# Patient Record
Sex: MALE | Employment: OTHER | RURAL
[De-identification: names, ages, dates, MRNs, and addresses within clinical notes are randomized per-mention and may not be internally consistent; named-entity substitution may affect disease eponyms.]

---

## 2017-03-01 ENCOUNTER — OFFICE VISIT (OUTPATIENT)
Dept: SURGERY | Age: 67
End: 2017-03-01

## 2017-03-01 VITALS
BODY MASS INDEX: 30.62 KG/M2 | HEART RATE: 92 BPM | WEIGHT: 226.1 LBS | HEIGHT: 72 IN | SYSTOLIC BLOOD PRESSURE: 150 MMHG | DIASTOLIC BLOOD PRESSURE: 87 MMHG

## 2017-03-01 DIAGNOSIS — K62.5 RECTAL BLEEDING: Primary | ICD-10-CM

## 2017-03-01 DIAGNOSIS — Z86.010 HX OF ADENOMATOUS COLONIC POLYPS: ICD-10-CM

## 2017-03-01 DIAGNOSIS — Z01.818 PREOPERATIVE EXAMINATION: ICD-10-CM

## 2017-03-01 NOTE — MR AVS SNAPSHOT
Visit Information Date & Time Provider Department Dept. Phone Encounter #  
 3/1/2017 11:00 AM Daljit Carrasco  [address]  [facility] [phone] [id number] Follow-up Instructions Return for postop follow up. Follow-up and Disposition History Your Appointments 3/31/2017  9:20 AM  
POST OP 10 MIN with MD BRODY Khan [facility] [address] ([facility]) Appt Note: F/U COLON  
 [place], [address] [facility],[address], [address] [employer] Upcoming Health Maintenance Date Due Hepatitis C Screening 1950 DTaP/Tdap/Td series (1 - Tdap) 9/19/1969 FOBT Q 1 YEAR AGE 50-75 9/19/1998 ZOSTER VACCINE AGE 60> 9/19/2008 GLAUCOMA SCREENING Q2Y 9/19/2013 Pneumococcal 65+ Low/Medium Risk (1 of 2 - PCV13) 9/19/2013 MEDICARE YEARLY EXAM 9/19/2013 INFLUENZA AGE 9 TO ADULT 8/1/2016 Allergies as of 3/1/2017  Review Complete On: 3/1/2017 By: Peace Toussaint LPN No Known Allergies Current Immunizations  Never Reviewed No immunizations on file. Not reviewed this visit You Were Diagnosed With   
  
 Codes Comments Rectal bleeding    -  Primary ICD-10-CM: K62.5 ICD-9-CM: 569.3 Hx of adenomatous colonic polyps     ICD-10-CM: Z86.010 
ICD-9-CM: V12.72 Preoperative examination     ICD-10-CM: Z01.818 ICD-9-CM: V72.84 Vitals BP  
  
  
  
  
  
 150/87 Vitals History BMI and BSA Data Body Mass Index Body Surface Area  
 30.66 kg/m 2 2.28 m 2 Your Updated Medication List  
  
   
This list is accurate as of: 3/1/17 11:39 AM.  Always use your most recent med list.  
  
  
  
  
 hydroCHLOROthiazide 25 mg tablet Commonly known as:  HYDRODIURIL Take 25 mg by mouth daily. lovastatin 20 mg tablet Commonly known as:  MEVACOR Take 20 mg by mouth nightly. metFORMIN 500 mg tablet Commonly known as:  GLUCOPHAGE Take  by mouth two (2) times daily (with meals). Follow-up Instructions Return for postop follow up. Introducing [facility]! [facility] introduces [email or web address] patient portal. Now you can access parts of your medical record, email your doctor's office, and request medication refills online. 1. In your internet browser, go to [email or web address] 2. Click on the First Time User? Click Here link in the Sign In box. You will see the New Member Sign Up page. 3. Enter your [email or web address] Access Code exactly as it appears below. You will not need to use this code after youve completed the sign-up process. If you do not sign up before the expiration date, you must request a new code. · [email or web address] Access Code: [email or web address] Expires: 5/30/2017 10:46 AM 
 
4. Enter the last four digits of your Social Security Number (xxxx) and Date of Birth (mm/dd/yyyy) as indicated and click Submit. You will be taken to the next sign-up page. 5. Create a [email or web address] ID. This will be your [email or web address] login ID and cannot be changed, so think of one that is secure and easy to remember. 6. Create a [email or web address] password. You can change your password at any time. 7. Enter your Password Reset Question and Answer. This can be used at a later time if you forget your password. 8. Enter your e-mail address. You will receive e-mail notification when new information is available in [address] 9. Click Sign Up. You can now view and download portions of your medical record. 10. Click the Download Summary menu link to download a portable copy of your medical information. If you have questions, please visit the Frequently Asked Questions section of the [email or web address] website. Remember, [email or web address] is NOT to be used for urgent needs. For medical emergencies, dial 911. Now available from your iPhone and Android! Please provide this summary of care documentation to your next provider. Your primary care clinician is listed as Merlin Meyer. If you have any questions after today's visit, please call [phone].

## 2017-03-01 NOTE — PROGRESS NOTES
HISTORY OF PRESENT ILLNESS  Nimesh Germain is a 74 y.o. male. Patient referred by Rafael Wilhelm for evaluation of rectal bleeding episode back in November. Known history of colon polyps, status post colonoscopy with polypectomy in 2015. HPI had an episode of red painless rectal bleeding back in November 2016 primary care provider recommended a repeat colonoscopy at that time the patient never got around to it. He returns today to follow-up with that. He had a colonoscopy in 2015 and a couple of adenomatous colon polyps were removed. Has not had any weight loss or change in his bowel habit has not had any unexplained abdominal pain. Past Medical History:   Diagnosis Date    Dyspepsia and other specified disorders of function of stomach     Hypertension        Past Surgical History:   Procedure Laterality Date    HX COLONOSCOPY  2011    luke polyp tub type    HX COLONOSCOPY  2015    5 polyps    HX PROSTATECTOMY           Current Outpatient Prescriptions:     lovastatin (MEVACOR) 20 mg tablet, Take 20 mg by mouth nightly., Disp: , Rfl:     hydrochlorothiazide (HYDRODIURIL) 25 mg tablet, Take 25 mg by mouth daily. , Disp: , Rfl:     metFORMIN (GLUCOPHAGE) 500 mg tablet, Take  by mouth two (2) times daily (with meals). , Disp: , Rfl:     Review of patient's allergies indicates no known allergies. family history includes Cancer in his brother and mother. Social History     Social History    Marital status:      Spouse name: N/A    Number of children: N/A    Years of education: N/A     Occupational History    Not on file. Social History Main Topics    Smoking status: Never Smoker    Smokeless tobacco: Never Used    Alcohol use No    Drug use: Not on file    Sexual activity: Not on file     Other Topics Concern    Not on file     Social History Narrative         Review of Systems   Constitutional: Negative. HENT: Negative. Eyes: Negative. Respiratory: Negative. Gastrointestinal: Negative. Negative for abdominal pain, blood in stool, constipation, diarrhea, heartburn, melena, nausea and vomiting. Rectal bleeding as described in the HPI   Genitourinary: Negative. Musculoskeletal: Negative. Skin: Negative. Neurological: Negative. Endo/Heme/Allergies: Negative. Psychiatric/Behavioral: Negative. Physical Exam   Constitutional: He is oriented to person, place, and time. He appears well-developed and well-nourished. No distress. HENT:   Head: Normocephalic and atraumatic. Right Ear: External ear normal.   Left Ear: External ear normal.   Nose: Nose normal.   Mouth/Throat: Oropharynx is clear and moist.   Eyes: Conjunctivae and EOM are normal. Pupils are equal, round, and reactive to light. Right eye exhibits no discharge. Left eye exhibits no discharge. No scleral icterus. Neck: Normal range of motion. Neck supple. No JVD present. No tracheal deviation present. No thyromegaly present. Cardiovascular: Normal rate, regular rhythm, normal heart sounds and intact distal pulses. Exam reveals no gallop and no friction rub. No murmur heard. Pulmonary/Chest: Effort normal and breath sounds normal. No stridor. No respiratory distress. He has no wheezes. He has no rales. He exhibits no tenderness. Abdominal: Soft. Bowel sounds are normal. He exhibits no distension and no mass. There is no tenderness. There is no rebound and no guarding. Musculoskeletal: Normal range of motion. He exhibits no edema, tenderness or deformity. Lymphadenopathy:     He has no cervical adenopathy. Neurological: He is alert and oriented to person, place, and time. He has normal reflexes. He displays normal reflexes. No cranial nerve deficit. He exhibits normal muscle tone. Coordination normal.   Skin: Skin is warm and dry. No rash noted. He is not diaphoretic. No erythema. No pallor. Psychiatric: He has a normal mood and affect.  His behavior is normal. Judgment and thought content normal.       ASSESSMENT and PLAN  Rectal bleeding. Personal history of adenomatous colon polyps. Preoperative exam.   [facility] will [facility]. She prefers a magnesium citrate prep. The expected benefits and potential risks and alternatives are discussed with the patient who demonstrates understanding and expresses his wish to have the procedure. More than half of the time spent with the patient was in face to face counseling. I spent 45 minutes in this encounter with the patient.

## 2017-03-31 ENCOUNTER — OFFICE VISIT (OUTPATIENT)
Dept: SURGERY | Age: 67
End: 2017-03-31

## 2017-03-31 VITALS
SYSTOLIC BLOOD PRESSURE: 132 MMHG | BODY MASS INDEX: 29 KG/M2 | HEIGHT: 74 IN | DIASTOLIC BLOOD PRESSURE: 74 MMHG | HEART RATE: 82 BPM | WEIGHT: 226 LBS

## 2017-03-31 DIAGNOSIS — D12.6 ADENOMATOUS COLON POLYP: ICD-10-CM

## 2017-03-31 DIAGNOSIS — D12.6 SERRATED ADENOMA OF COLON: ICD-10-CM

## 2017-03-31 DIAGNOSIS — Z98.890 STATUS POST COLONOSCOPY WITH POLYPECTOMY: Primary | ICD-10-CM

## 2017-03-31 NOTE — PROGRESS NOTES
HISTORY OF PRESENT ILLNESS  Mike Winslow is a 74 y.o. male. Status post colonoscopy with polypectomy adenomatous polyps were removed 1 of them had some serrated features. Recommend repeat colonoscopy in one year. HPI this patient reports that he tolerated the preparation and the procedure well    ROS he has no new complaints    Physical Exam  The examination is benign  ASSESSMENT and PLAN  Serrated adenoma from the rectosigmoid colon. Removed at colonoscopy with polypectomy. Recommend repeat colonoscopy in one year. More than half of the time spent with the patient was in face to face counseling. I spent 15 minutes in this encounter with the patient.

## 2024-03-12 ENCOUNTER — HOSPITAL ENCOUNTER (OUTPATIENT)
Facility: HOSPITAL | Age: 74
Discharge: HOME | End: 2024-03-15

## 2024-03-12 VITALS
WEIGHT: 216 LBS | HEART RATE: 67 BPM | BODY MASS INDEX: 24.99 KG/M2 | HEIGHT: 78 IN | DIASTOLIC BLOOD PRESSURE: 84 MMHG | SYSTOLIC BLOOD PRESSURE: 159 MMHG

## 2024-03-12 DIAGNOSIS — C61 PROSTATE CANCER (HCC): Primary | ICD-10-CM

## 2024-03-12 RX ORDER — LISINOPRIL 10 MG/1
10 TABLET ORAL DAILY
COMMUNITY

## 2024-03-12 RX ORDER — ATORVASTATIN CALCIUM 80 MG/1
TABLET, FILM COATED ORAL
COMMUNITY
Start: 2022-04-03

## 2024-03-12 RX ORDER — HYDROCHLOROTHIAZIDE 25 MG/1
25 TABLET ORAL DAILY
COMMUNITY

## 2024-03-12 RX ORDER — LOVASTATIN 20 MG/1
20 TABLET ORAL NIGHTLY
COMMUNITY

## 2024-03-12 ASSESSMENT — PAIN SCALES - GENERAL: PAINLEVEL_OUTOF10: 0

## 2024-03-12 NOTE — PROGRESS NOTES
Cancer [place] at [facility]  [facility]    Radiation Oncology Consultation  Encounter Date: 03/12/24    Fercho Walker  [id number]  1950     Diagnosis   C61: initially sP2kO8M2, iPSA 5.0, GG2 prostate cancer s/p RP (2005) with BCR s/p salvage RT (2011), now with recurrence in the pelvic lymph nodes    AJCC Staging has been reviewed.  History of Present Illness   Mr. Walker is a 73 y.o. male seen in consultation at the request of Dr. Short to assess the role of radiation for his prostate cancer.    his oncologic history is detailed below:  02/23/2005: Initially diagnosed with a dR0sO3W2, iPSA 5.0, GG2 prostate cancer for which he underwent radical retropubic prostatectomy with PLNDx showing GS 3+4=7 disease, negative margins, no PNI, LVSI, SVI, staged pT2aN0  10/05/2009: PSA 0.1 (first detectable PSA)  04/20/2010: PSA 0.19  11/09/2010: PSA 0.44  01/03/2011: He underwent a course of salvage EBRT in [place], [place] ([facility], now under [facility]). Records unavailable, per patient he received about 7.5 weeks of radiation.  07/08/2011: PSA(us) 0.04  06/26/2013: PSA(us) 0.04  06/27/2014: PSA(us) 0.065  08/05/2015: PSA(us) 0.091  08/10/2016: PSA 0.158  06/03/2022: PSA 1.09 (gradual increase over 6 years)  08/17/2022: PSMA PET showed no PyL binding in the prostatectomy bed or distantly. There is a tiny focus of PyL binding in the posterior right pelvic medial to the obturator internus without CT correlate of unclear significance.  11/15/2023: PSA 2.24  12/06/2023: PSMA PET showed PyL binding within multiple pelvic lymph nodes including 8mm right common iliac, 5mm left common iliac, 6mm right internal iliac, 5mm left periaortic nodes. Note of mild PyL binding in a well-circumscribed lesion in the right femur likely degenerative. No PyL binding in the prostate bed or viscera.  02/14/2024: PSA 2.48    Symptomatically, he is doing well, has no complaints today,

## 2024-07-02 ENCOUNTER — HOSPITAL ENCOUNTER (OUTPATIENT)
Facility: HOSPITAL | Age: 74
Discharge: HOME | End: 2024-07-05
Payer: MEDICARE

## 2024-07-02 VITALS
DIASTOLIC BLOOD PRESSURE: 83 MMHG | HEIGHT: 78 IN | BODY MASS INDEX: 25.8 KG/M2 | SYSTOLIC BLOOD PRESSURE: 148 MMHG | HEART RATE: 71 BPM | WEIGHT: 223 LBS

## 2024-07-02 DIAGNOSIS — C61 PROSTATE CANCER (HCC): Primary | ICD-10-CM

## 2024-07-02 PROCEDURE — 99213 OFFICE O/P EST LOW 20 MIN: CPT

## 2024-07-02 ASSESSMENT — PAIN SCALES - GENERAL: PAINLEVEL_OUTOF10: 0

## 2024-07-02 NOTE — PROGRESS NOTES
practice has closed down and the [facility] system doesn't have his records in their database. Symptomatically he is doing well today, has no complaints from a ADT standpoint, has intermittent hot flashes. Continues to be on ADT/xtandi.    IPSS:  Symptom Score   0 = Not at all   Incomplete Emptying 0   1 = Less than 1 in 5   Frequency 1   2 = Less than half the time    Intermittency 0   3 = About half the time   Urgency 0   4 =  More than half the time   Weak Stream 0   5 =  Almost always   Straining 0         Nocturia 3      Total 4         Quality of Life 1 (pleased)      0-7 Mildly symptomatic  8-19 moderately symptomatic  20-35 severely symptomatic    ESTELLE score:  Confidence 1   Penetration 4   Ability to Maintain 4   Satisfaction 4   Maintain to Completion 2   Total 15     Review of Systems:  A complete review of systems was obtained and negative except as described above.    Interval Imaging/Labs   None    Above imaging/lab reports were reviewed.  Allergies and Medications   No Known Allergies    Current Outpatient Medications   Medication Instructions    atorvastatin (LIPITOR) 80 MG tablet TAKE 1 TABLET BY MOUTH ONCE DAILY AT BEDTIME for 90    hydroCHLOROthiazide (HYDRODIURIL) 25 mg, Oral, DAILY    lisinopril (PRINIVIL;ZESTRIL) 10 mg, Oral, DAILY    lovastatin (MEVACOR) 20 mg, Oral, NIGHTLY    metFORMIN (GLUCOPHAGE) 500 mg, 2 TIMES DAILY WITH MEALS    vitamin D 25 MCG (1000 UT) CAPS 1 capsule        Physical Exam   Vitals: Blood pressure (!) 148/83, pulse 71, height 1.981 m (6' 6\"), weight 101.2 kg (223 lb). Pain Level: 0  Performance Status: ECOG 0, fully active, able to carry on all predisease activities without restrictions  Constitutional: Pleasant, sitting comfortably in no acute distress.   HEENT: Moist mucous membranes.  Cardiac: Good peripheral perfusion, no upper or lower extremity edema bilaterally.  Pulmonary: No increased work of breathing. Symmetric chest rise  Skin: Warm, dry, no rashes

## 2024-10-09 ENCOUNTER — HOSPITAL ENCOUNTER (OUTPATIENT)
Facility: HOSPITAL | Age: 74
Discharge: HOME | End: 2024-10-12

## 2024-10-09 ENCOUNTER — HOSPITAL ENCOUNTER (OUTPATIENT)
Facility: HOSPITAL | Age: 74
Discharge: HOME | End: 2024-10-12
Attending: STUDENT IN AN ORGANIZED HEALTH CARE EDUCATION/TRAINING PROGRAM

## 2024-10-09 VITALS
DIASTOLIC BLOOD PRESSURE: 84 MMHG | RESPIRATION RATE: 18 BRPM | HEART RATE: 74 BPM | TEMPERATURE: 98 F | HEIGHT: 78 IN | WEIGHT: 222 LBS | SYSTOLIC BLOOD PRESSURE: 157 MMHG | BODY MASS INDEX: 25.69 KG/M2

## 2024-10-09 DIAGNOSIS — C77.5 SECONDARY MALIGNANT NEOPLASM OF INTRAPELVIC LYMPH NODES (HCC): ICD-10-CM

## 2024-10-09 DIAGNOSIS — C61 PROSTATE CANCER (HCC): Primary | ICD-10-CM

## 2024-10-09 NOTE — PROGRESS NOTES
Cancer [place] at [facility]  [facility]    Radiation Oncology Follow Up  Encounter Date: 10/09/24    Fercho Walker  [id number]  1950     Diagnosis   C61, C77.5: initially [id number], iPSA 5.0, GG2 prostate cancer s/p RP (2005) with BCR s/p salvage RT (2011), now with recurrence in the pelvic lymph nodes     AJCC Staging has been reviewed.    Oncologic History   02/23/2005: Initially diagnosed with a [id number], iPSA 5.0, GG2 prostate cancer for which he underwent radical retropubic prostatectomy with PLNDx showing GS 3+4=7 disease, negative margins, no PNI, LVSI, SVI, staged pT2aN0  10/05/2009: PSA 0.1 (first detectable PSA)  04/20/2010: PSA 0.19  11/09/2010: PSA 0.44  01/03/2011: He underwent a course of salvage EBRT in [place], [place] ([facility], now under [facility]). Records unavailable, per patient he received about 7.5 weeks of radiation.  07/08/2011: PSA(us) 0.04  06/26/2013: PSA(us) 0.04  06/27/2014: PSA(us) 0.065  08/05/2015: PSA(us) 0.091  08/10/2016: PSA 0.158  06/03/2022: PSA 1.09 (gradual increase over 6 years)  08/17/2022: PSMA PET showed no PyL binding in the prostatectomy bed or distantly. There is a tiny focus of PyL binding in the posterior right pelvic medial to the obturator internus without CT correlate of unclear significance.  11/15/2023: PSA 2.24  12/06/2023: PSMA PET showed PyL binding within multiple pelvic lymph nodes including 8mm right common iliac, 5mm left common iliac, 6mm right internal iliac, 5mm left periaortic nodes. Note of mild PyL binding in a well-circumscribed lesion in the right femur likely degenerative. No PyL binding in the prostate bed or viscera.  02/14/2024: PSA 2.48  02/24/2024: Began xtandi  03/18/2024: Began ADT (eligard 6mo)   05/29/2024: PSA <0.014, testosterone: <2.50  09/19/2024: PSMA PET showed interval decrease in size and PyL binding within previously visualized lymph nodes consistent with

## 2024-10-21 ENCOUNTER — HOSPITAL ENCOUNTER (OUTPATIENT)
Facility: HOSPITAL | Age: 74
Discharge: HOME | End: 2024-10-24
Attending: STUDENT IN AN ORGANIZED HEALTH CARE EDUCATION/TRAINING PROGRAM

## 2024-11-12 ENCOUNTER — HOSPITAL ENCOUNTER (OUTPATIENT)
Facility: HOSPITAL | Age: 74
Discharge: HOME | End: 2024-11-15
Attending: STUDENT IN AN ORGANIZED HEALTH CARE EDUCATION/TRAINING PROGRAM

## 2024-11-12 LAB
RAD ONC ARIA COURSE FIRST TREATMENT DATE: NORMAL
RAD ONC ARIA COURSE ID: NORMAL
RAD ONC ARIA COURSE LAST TREATMENT DATE: NORMAL
RAD ONC ARIA COURSE SESSION NUMBER: 1
RAD ONC ARIA COURSE START DATE: NORMAL
RAD ONC ARIA COURSE TREATMENT ELAPSED DAYS: 0
RAD ONC ARIA PLAN FRACTIONS TREATED TO DATE: 1
RAD ONC ARIA PLAN FRACTIONS TREATED TO DATE: 1
RAD ONC ARIA PLAN ID: NORMAL
RAD ONC ARIA PLAN ID: NORMAL
RAD ONC ARIA PLAN PRESCRIBED DOSE PER FRACTION: 8 GY
RAD ONC ARIA PLAN PRESCRIBED DOSE PER FRACTION: 8 GY
RAD ONC ARIA PLAN PRIMARY REFERENCE POINT: NORMAL
RAD ONC ARIA PLAN PRIMARY REFERENCE POINT: NORMAL
RAD ONC ARIA PLAN TOTAL FRACTIONS PRESCRIBED: 5
RAD ONC ARIA PLAN TOTAL FRACTIONS PRESCRIBED: 5
RAD ONC ARIA PLAN TOTAL PRESCRIBED DOSE: 4000 CGY
RAD ONC ARIA PLAN TOTAL PRESCRIBED DOSE: 4000 CGY
RAD ONC ARIA REFERENCE POINT DOSAGE GIVEN TO DATE: 8 GY
RAD ONC ARIA REFERENCE POINT DOSAGE GIVEN TO DATE: 8.02 GY
RAD ONC ARIA REFERENCE POINT DOSAGE GIVEN TO DATE: 8.08 GY
RAD ONC ARIA REFERENCE POINT ID: NORMAL
RAD ONC ARIA REFERENCE POINT SESSION DOSAGE GIVEN: 8 GY
RAD ONC ARIA REFERENCE POINT SESSION DOSAGE GIVEN: 8.02 GY
RAD ONC ARIA REFERENCE POINT SESSION DOSAGE GIVEN: 8.08 GY

## 2024-11-14 ENCOUNTER — HOSPITAL ENCOUNTER (OUTPATIENT)
Facility: HOSPITAL | Age: 74
Discharge: HOME | End: 2024-11-17
Attending: STUDENT IN AN ORGANIZED HEALTH CARE EDUCATION/TRAINING PROGRAM

## 2024-11-14 LAB
RAD ONC ARIA COURSE FIRST TREATMENT DATE: NORMAL
RAD ONC ARIA COURSE ID: NORMAL
RAD ONC ARIA COURSE LAST TREATMENT DATE: NORMAL
RAD ONC ARIA COURSE SESSION NUMBER: 2
RAD ONC ARIA COURSE START DATE: NORMAL
RAD ONC ARIA COURSE TREATMENT ELAPSED DAYS: 2
RAD ONC ARIA PLAN FRACTIONS TREATED TO DATE: 2
RAD ONC ARIA PLAN FRACTIONS TREATED TO DATE: 2
RAD ONC ARIA PLAN ID: NORMAL
RAD ONC ARIA PLAN ID: NORMAL
RAD ONC ARIA PLAN PRESCRIBED DOSE PER FRACTION: 8 GY
RAD ONC ARIA PLAN PRESCRIBED DOSE PER FRACTION: 8 GY
RAD ONC ARIA PLAN PRIMARY REFERENCE POINT: NORMAL
RAD ONC ARIA PLAN PRIMARY REFERENCE POINT: NORMAL
RAD ONC ARIA PLAN TOTAL FRACTIONS PRESCRIBED: 5
RAD ONC ARIA PLAN TOTAL FRACTIONS PRESCRIBED: 5
RAD ONC ARIA PLAN TOTAL PRESCRIBED DOSE: 4000 CGY
RAD ONC ARIA PLAN TOTAL PRESCRIBED DOSE: 4000 CGY
RAD ONC ARIA REFERENCE POINT DOSAGE GIVEN TO DATE: 16 GY
RAD ONC ARIA REFERENCE POINT DOSAGE GIVEN TO DATE: 16.05 GY
RAD ONC ARIA REFERENCE POINT DOSAGE GIVEN TO DATE: 16.16 GY
RAD ONC ARIA REFERENCE POINT ID: NORMAL
RAD ONC ARIA REFERENCE POINT SESSION DOSAGE GIVEN: 8 GY
RAD ONC ARIA REFERENCE POINT SESSION DOSAGE GIVEN: 8.02 GY
RAD ONC ARIA REFERENCE POINT SESSION DOSAGE GIVEN: 8.08 GY

## 2024-11-18 ENCOUNTER — HOSPITAL ENCOUNTER (OUTPATIENT)
Facility: HOSPITAL | Age: 74
Discharge: HOME | End: 2024-11-21
Attending: STUDENT IN AN ORGANIZED HEALTH CARE EDUCATION/TRAINING PROGRAM

## 2024-11-18 LAB
RAD ONC ARIA COURSE FIRST TREATMENT DATE: NORMAL
RAD ONC ARIA COURSE ID: NORMAL
RAD ONC ARIA COURSE LAST TREATMENT DATE: NORMAL
RAD ONC ARIA COURSE SESSION NUMBER: 3
RAD ONC ARIA COURSE START DATE: NORMAL
RAD ONC ARIA COURSE TREATMENT ELAPSED DAYS: 6
RAD ONC ARIA PLAN FRACTIONS TREATED TO DATE: 3
RAD ONC ARIA PLAN FRACTIONS TREATED TO DATE: 3
RAD ONC ARIA PLAN ID: NORMAL
RAD ONC ARIA PLAN ID: NORMAL
RAD ONC ARIA PLAN PRESCRIBED DOSE PER FRACTION: 8 GY
RAD ONC ARIA PLAN PRESCRIBED DOSE PER FRACTION: 8 GY
RAD ONC ARIA PLAN PRIMARY REFERENCE POINT: NORMAL
RAD ONC ARIA PLAN PRIMARY REFERENCE POINT: NORMAL
RAD ONC ARIA PLAN TOTAL FRACTIONS PRESCRIBED: 5
RAD ONC ARIA PLAN TOTAL FRACTIONS PRESCRIBED: 5
RAD ONC ARIA PLAN TOTAL PRESCRIBED DOSE: 4000 CGY
RAD ONC ARIA PLAN TOTAL PRESCRIBED DOSE: 4000 CGY
RAD ONC ARIA REFERENCE POINT DOSAGE GIVEN TO DATE: 24 GY
RAD ONC ARIA REFERENCE POINT DOSAGE GIVEN TO DATE: 24.07 GY
RAD ONC ARIA REFERENCE POINT DOSAGE GIVEN TO DATE: 24.24 GY
RAD ONC ARIA REFERENCE POINT ID: NORMAL
RAD ONC ARIA REFERENCE POINT SESSION DOSAGE GIVEN: 8 GY
RAD ONC ARIA REFERENCE POINT SESSION DOSAGE GIVEN: 8.02 GY
RAD ONC ARIA REFERENCE POINT SESSION DOSAGE GIVEN: 8.08 GY

## 2024-11-20 ENCOUNTER — HOSPITAL ENCOUNTER (OUTPATIENT)
Facility: HOSPITAL | Age: 74
Discharge: HOME | End: 2024-11-23
Attending: STUDENT IN AN ORGANIZED HEALTH CARE EDUCATION/TRAINING PROGRAM

## 2024-11-20 LAB
RAD ONC ARIA COURSE FIRST TREATMENT DATE: NORMAL
RAD ONC ARIA COURSE ID: NORMAL
RAD ONC ARIA COURSE LAST TREATMENT DATE: NORMAL
RAD ONC ARIA COURSE SESSION NUMBER: 4
RAD ONC ARIA COURSE START DATE: NORMAL
RAD ONC ARIA COURSE TREATMENT ELAPSED DAYS: 8
RAD ONC ARIA PLAN FRACTIONS TREATED TO DATE: 4
RAD ONC ARIA PLAN FRACTIONS TREATED TO DATE: 4
RAD ONC ARIA PLAN ID: NORMAL
RAD ONC ARIA PLAN ID: NORMAL
RAD ONC ARIA PLAN PRESCRIBED DOSE PER FRACTION: 8 GY
RAD ONC ARIA PLAN PRESCRIBED DOSE PER FRACTION: 8 GY
RAD ONC ARIA PLAN PRIMARY REFERENCE POINT: NORMAL
RAD ONC ARIA PLAN PRIMARY REFERENCE POINT: NORMAL
RAD ONC ARIA PLAN TOTAL FRACTIONS PRESCRIBED: 5
RAD ONC ARIA PLAN TOTAL FRACTIONS PRESCRIBED: 5
RAD ONC ARIA PLAN TOTAL PRESCRIBED DOSE: 4000 CGY
RAD ONC ARIA PLAN TOTAL PRESCRIBED DOSE: 4000 CGY
RAD ONC ARIA REFERENCE POINT DOSAGE GIVEN TO DATE: 32 GY
RAD ONC ARIA REFERENCE POINT DOSAGE GIVEN TO DATE: 32.09 GY
RAD ONC ARIA REFERENCE POINT DOSAGE GIVEN TO DATE: 32.32 GY
RAD ONC ARIA REFERENCE POINT ID: NORMAL
RAD ONC ARIA REFERENCE POINT SESSION DOSAGE GIVEN: 8 GY
RAD ONC ARIA REFERENCE POINT SESSION DOSAGE GIVEN: 8.02 GY
RAD ONC ARIA REFERENCE POINT SESSION DOSAGE GIVEN: 8.08 GY

## 2024-11-20 ASSESSMENT — PATIENT HEALTH QUESTIONNAIRE - PHQ9
SUM OF ALL RESPONSES TO PHQ QUESTIONS 1-9: 0
2. FEELING DOWN, DEPRESSED OR HOPELESS: NOT AT ALL
SUM OF ALL RESPONSES TO PHQ9 QUESTIONS 1 & 2: 0
SUM OF ALL RESPONSES TO PHQ QUESTIONS 1-9: 0
1. LITTLE INTEREST OR PLEASURE IN DOING THINGS: NOT AT ALL

## 2024-11-20 ASSESSMENT — PAIN SCALES - GENERAL: PAINLEVEL_OUTOF10: 0

## 2024-11-20 NOTE — ON TREATMENT VISIT
Cancer [place] at [facility]  [facility]    Radiation Oncology Weekly Progress Note  Encounter Date: 11/20/24     Fercho Walker  [id number]  1950       Diagnosis   C61, C77.5: initially hY2lL6P1, iPSA 5.0, GG2 prostate cancer s/p RP (2005) with BCR s/p salvage RT (2011), now with recurrence in the pelvic lymph nodes     AJCC Staging has been reviewed.    Interval History   Mr. Walker is a 74 y.o. male seen today for his weekly on-treatment evaluation    11/20/24: Fx 4 today. Denies GI or  complaints, energy is still very good. Discussed follow up plan for 3 mo with PSA/T. No other concerns      Treatment Details:     Treatment Site Dose/Fx (cGy) #Fx Current Dose (cGy) Total Planned Dose (cGy) Start Date End Date   Pelvic LN SBRT 800 4/5 3200 4000 11/12/24 ongoing     Concurrent Therapy: Concurrent ADT: ADT + ARPI    Allergies and Medications   No Known Allergies    Current Outpatient Medications   Medication Instructions    atorvastatin (LIPITOR) 80 MG tablet TAKE 1 TABLET BY MOUTH ONCE DAILY AT BEDTIME for 90    hydroCHLOROthiazide (HYDRODIURIL) 25 mg, Oral, DAILY    lisinopril (PRINIVIL;ZESTRIL) 10 mg, Oral, DAILY    lovastatin (MEVACOR) 20 mg, Oral, NIGHTLY    metFORMIN (GLUCOPHAGE) 500 mg, 2 TIMES DAILY WITH MEALS    vitamin D 25 MCG (1000 UT) CAPS 1 capsule        Physical Exam:   Vitals: There were no vitals taken for this visit. Pain Level: 0  Performance Status: ECOG 0, fully active, able to carry on all predisease activities without restrictions  Constitutional: Pleasant, sitting comfortably in no acute distress.  Respiratory: Non-labored breathing  Neurologic: Alert and oriented.  Psychiatric: Cooperative to commands and responds to questions appropriately.  See \"Interval history\" above for additional relevant exam findings.    Assessment & Plan   - Continue radiation treatment as planned  - Treatment setup and plan reviewed. Port images/CBCT images reviewed.

## 2024-11-22 ENCOUNTER — CLINICAL DOCUMENTATION (OUTPATIENT)
Facility: HOSPITAL | Age: 74
End: 2024-11-22

## 2024-11-22 ENCOUNTER — HOSPITAL ENCOUNTER (OUTPATIENT)
Facility: HOSPITAL | Age: 74
Discharge: HOME | End: 2024-11-25
Attending: STUDENT IN AN ORGANIZED HEALTH CARE EDUCATION/TRAINING PROGRAM

## 2024-11-22 LAB
RAD ONC ARIA COURSE FIRST TREATMENT DATE: NORMAL
RAD ONC ARIA COURSE ID: NORMAL
RAD ONC ARIA COURSE LAST TREATMENT DATE: NORMAL
RAD ONC ARIA COURSE SESSION NUMBER: 5
RAD ONC ARIA COURSE START DATE: NORMAL
RAD ONC ARIA COURSE TREATMENT ELAPSED DAYS: 10
RAD ONC ARIA PLAN FRACTIONS TREATED TO DATE: 5
RAD ONC ARIA PLAN FRACTIONS TREATED TO DATE: 5
RAD ONC ARIA PLAN ID: NORMAL
RAD ONC ARIA PLAN ID: NORMAL
RAD ONC ARIA PLAN PRESCRIBED DOSE PER FRACTION: 8 GY
RAD ONC ARIA PLAN PRESCRIBED DOSE PER FRACTION: 8 GY
RAD ONC ARIA PLAN PRIMARY REFERENCE POINT: NORMAL
RAD ONC ARIA PLAN PRIMARY REFERENCE POINT: NORMAL
RAD ONC ARIA PLAN TOTAL FRACTIONS PRESCRIBED: 5
RAD ONC ARIA PLAN TOTAL FRACTIONS PRESCRIBED: 5
RAD ONC ARIA PLAN TOTAL PRESCRIBED DOSE: 4000 CGY
RAD ONC ARIA PLAN TOTAL PRESCRIBED DOSE: 4000 CGY
RAD ONC ARIA REFERENCE POINT DOSAGE GIVEN TO DATE: 40 GY
RAD ONC ARIA REFERENCE POINT DOSAGE GIVEN TO DATE: 40.11 GY
RAD ONC ARIA REFERENCE POINT DOSAGE GIVEN TO DATE: 40.4 GY
RAD ONC ARIA REFERENCE POINT ID: NORMAL
RAD ONC ARIA REFERENCE POINT SESSION DOSAGE GIVEN: 8 GY
RAD ONC ARIA REFERENCE POINT SESSION DOSAGE GIVEN: 8.02 GY
RAD ONC ARIA REFERENCE POINT SESSION DOSAGE GIVEN: 8.08 GY

## 2024-12-02 NOTE — PROGRESS NOTES
Cancer [place] at [facility]  [facility]    Radiation Oncology End of Treatment Summary    Fercho Walker  [id number]  1950     Diagnosis   C61, C77.5: initially [id number], iPSA 5.0, GG2 prostate cancer s/p RP (2005) with BCR s/p salvage RT (2011), now with recurrence in the pelvic lymph nodes       Oncologic History   Mr. Walker is a 74 y.o. male initially seen in consultation to assess the overall management and role of radiation for his above diagnosis.    02/23/2005: Initially diagnosed with a pL2tS0U5, iPSA 5.0, GG2 prostate cancer for which he underwent radical retropubic prostatectomy with PLNDx showing GS 3+4=7 disease, negative margins, no PNI, LVSI, SVI, staged pT2aN0  10/05/2009: PSA 0.1 (first detectable PSA)  04/20/2010: PSA 0.19  11/09/2010: PSA 0.44  01/03/2011: He underwent a course of salvage EBRT in [place], [place] ([facility], now under [facility]). Records unavailable, per patient he received about 7.5 weeks of radiation.  07/08/2011: PSA(us) 0.04  06/26/2013: PSA(us) 0.04  06/27/2014: PSA(us) 0.065  08/05/2015: PSA(us) 0.091  08/10/2016: PSA 0.158  06/03/2022: PSA 1.09 (gradual increase over 6 years)  08/17/2022: PSMA PET showed no PyL binding in the prostatectomy bed or distantly. There is a tiny focus of PyL binding in the posterior right pelvic medial to the obturator internus without CT correlate of unclear significance.  11/15/2023: PSA 2.24  12/06/2023: PSMA PET showed PyL binding within multiple pelvic lymph nodes including 8mm right common iliac, 5mm left common iliac, 6mm right internal iliac, 5mm left periaortic nodes. Note of mild PyL binding in a well-circumscribed lesion in the right femur likely degenerative. No PyL binding in the prostate bed or viscera.  02/14/2024: PSA 2.48  02/24/2024: Began xtandi  03/18/2024: Began ADT (eligard 6mo)   05/29/2024: PSA <0.014, testosterone: <2.50  09/19/2024: PSMA PET showed

## 2025-03-07 ENCOUNTER — HOSPITAL ENCOUNTER (OUTPATIENT)
Facility: HOSPITAL | Age: 75
Discharge: HOME | End: 2025-03-10

## 2025-03-07 VITALS
WEIGHT: 214 LBS | HEART RATE: 77 BPM | HEIGHT: 73 IN | SYSTOLIC BLOOD PRESSURE: 181 MMHG | BODY MASS INDEX: 28.36 KG/M2 | DIASTOLIC BLOOD PRESSURE: 101 MMHG

## 2025-03-07 DIAGNOSIS — C61 PROSTATE CANCER (HCC): Primary | ICD-10-CM

## 2025-03-07 DIAGNOSIS — C77.5 SECONDARY MALIGNANT NEOPLASM OF INTRAPELVIC LYMPH NODES (HCC): ICD-10-CM

## 2025-03-07 ASSESSMENT — PAIN SCALES - GENERAL: PAINLEVEL_OUTOF10: 0

## 2025-03-07 NOTE — PROGRESS NOTES
Cancer [place] at 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  Oncologic History   02/23/2005: Initially diagnosed with a gN8kP6C8, iPSA 5.0, GG2 prostate cancer for which he underwent radical retropubic prostatectomy with PLNDx showing GS 3+4=7 disease, negative margins, no PNI, LVSI, SVI, staged pT2aN0  10/05/2009: PSA 0.1 (first detectable PSA)  04/20/2010: PSA 0.19  11/09/2010: PSA 0.44  01/03/2011: He underwent a course of salvage EBRT in [place], [place] ([facility], now under [facility]). Records unavailable, per patient he received about 7.5 weeks of radiation.  07/08/2011: PSA(us) 0.04  06/26/2013: PSA(us) 0.04  06/27/2014: PSA(us) 0.065  08/05/2015: PSA(us) 0.091  08/10/2016: PSA 0.158  06/03/2022: PSA 1.09 (gradual increase over 6 years)  08/17/2022: PSMA PET showed no PyL binding in the prostatectomy bed or distantly. There is a tiny focus of PyL binding in the posterior right pelvic medial to the obturator internus without CT correlate of unclear significance.  11/15/2023: PSA 2.24  12/06/2023: PSMA PET showed PyL binding within multiple pelvic lymph nodes including 8mm right common iliac, 5mm left common iliac, 6mm right internal iliac, 5mm left periaortic nodes. Note of mild PyL binding in a well-circumscribed lesion in the right femur likely degenerative. No PyL binding in the prostate bed or viscera.  02/14/2024: PSA 2.48  02/24/2024: Began xtandi  03/18/2024: Began ADT (eligard 6mo)   05/29/2024: PSA <0.014, testosterone: <2.50  09/19/2024: PSMA PET showed interval decrease in size and PyL binding within previously visualized lymph nodes consistent with interval therapy.

## 2025-08-28 ENCOUNTER — APPOINTMENT (OUTPATIENT)
Dept: URBAN - METROPOLITAN AREA CLINIC 277 | Age: 75
Setting detail: DERMATOLOGY
End: 2025-08-28

## 2025-08-28 DIAGNOSIS — D22 MELANOCYTIC NEVI: ICD-10-CM

## 2025-08-28 DIAGNOSIS — Q82.5 CONGENITAL NON-NEOPLASTIC NEVUS: ICD-10-CM

## 2025-08-28 DIAGNOSIS — L72.8 OTHER FOLLICULAR CYSTS OF THE SKIN AND SUBCUTANEOUS TISSUE: ICD-10-CM

## 2025-08-28 PROBLEM — D22.5 MELANOCYTIC NEVI OF TRUNK: Status: ACTIVE | Noted: 2025-08-28

## 2025-08-28 PROCEDURE — OTHER MIPS QUALITY: OTHER

## 2025-08-28 PROCEDURE — OTHER PHOTO-DOCUMENTATION: OTHER

## 2025-08-28 PROCEDURE — OTHER DEFER: OTHER

## 2025-08-28 PROCEDURE — OTHER COUNSELING: OTHER

## 2025-08-28 PROCEDURE — OTHER REASSURANCE: OTHER

## 2025-08-28 PROCEDURE — 99203 OFFICE O/P NEW LOW 30 MIN: CPT

## 2025-08-28 ASSESSMENT — LOCATION ZONE DERM
LOCATION ZONE: SCALP
LOCATION ZONE: TRUNK

## 2025-08-28 ASSESSMENT — LOCATION DETAILED DESCRIPTION DERM
LOCATION DETAILED: RIGHT INFERIOR UPPER BACK
LOCATION DETAILED: RIGHT CENTRAL FRONTAL SCALP

## 2025-08-28 ASSESSMENT — LOCATION SIMPLE DESCRIPTION DERM
LOCATION SIMPLE: RIGHT UPPER BACK
LOCATION SIMPLE: RIGHT SCALP